# Patient Record
(demographics unavailable — no encounter records)

---

## 2024-10-15 NOTE — ASSESSMENT
[FreeTextEntry1] : The patient has thyroid cancer that was diagnosed in 2007 and she never had a recurrence - she did not have radioactive iodine therapy post-operatively.   At this time, the likelihood of recurrence for her thyroid cancer is low so her target TSH is the normal range for the testing lab.   Last TSH was at goal.     The patient had a parathyroid adenoma s/p parathyroidectomy - corrected calcium is normal, in October 2023, PTH was normal - I will monitor her corrected calcium level  Last TSH - August 2024 - N   Plan: 1. Continue levothyroxine (generic) 75 micrograms po daily 2. Labs to be done in 6 months - TSH, CMP, 25 OH vitamin D, PTH 3. Follow up in 6 months to review results.

## 2024-10-15 NOTE — HISTORY OF PRESENT ILLNESS
[FreeTextEntry1] : Problems: 1. Thyroid cancer 2. Parathyroid adenoma  Thyroid cancer/Parathyroid adenoma 1. Diagnosed in 2007 with thyroid cancer - papillary. Subtotal thyroidectomy was done in 2007. A parathyroid gland was removed also and the patient was found to have a parathyroid adenoma - she is unsure if she had hypercalcemia pre-operatively. Patient did not have radioactive iodine therapy post operatively. Pathology 2007 - subtotal thyroidectomy - Papillary thyroid cancer measuring 1.7 cm - no invasion through capsule or extrathyroidal tissues, no lymphovascular invasion, margins negative. A parathyroid adenoma was also present - weight of adenoma not stated 2. Labs: Corrected calcium normal in August 2022, thyroglobulin antibodies in 2021 were negative I did not see any thyroid US post operatively No thyroglobulin levels were seen between 2007 post op and April 2024.  October 2022 - PTH N, 25 OH vitamin D 33.6, Cr N, corrected calcium N August 2024 - Thyroglobulin 6.65 (1.6 to 59.9), thyroglobulin antibodies < 20 N, TSH N 3.  No family history of thyroid disorders, no family history of thyroid cancer, no personal history of radiation treatment 4. Meds: Levothyroxine (generic) 75 micrograms po daily - fully compliant and patient advised on the appropriate use of levothyroxine.

## 2024-10-15 NOTE — PHYSICAL EXAM
[de-identified] : General: No distress, well nourished Eyes: Normal Sclera, EOMI, PERRL ENT: Normal appearance of the nose, normal oropharynx Neck/Thyroid: No cervical lymphadenopathy, no neck masses, non-tender Respiratory: No use of accessory muscles of respiration, vesicular breath sounds heard bilaterally, no crepitations or ronchi Cardiovascular: S1 and S2 heard and normal, no S3 or S4, no murmurs, radial pulse normal bilaterally Abdomen: soft, non-tender, no masses, normal bowel sounds Musculoskeletal: No swelling or deformities of joints of hands, no pedal edema Neurological: Normal range of motion in the hands, Normal brachioradialis reflexes bilaterally Psychiatry: Patient converses normally, good judgement and insight Skin: No rashes in hands, no nodules palpated in hands

## 2024-11-19 NOTE — HISTORY OF PRESENT ILLNESS
[FreeTextEntry1] : new bumps increased size for 2-3 months. Painful bump right back. [de-identified] : No personal or family history of cutaneous malignancy. Recent cutaneous exam.

## 2024-11-19 NOTE — ASSESSMENT
[FreeTextEntry1] : Alert, oriented, well, pleasant.  Brown yellow papules face hands back. Seborrheic keratoses. No treatment.  White papulonodule right nose bridge. Cyst.   Dark papulonodule right cheek 0.9cm, tender. Abscess.  Informed consent given. Side effects reviewed. Xylo epi. Incision and drainage of abscess right cheek. Dark brown keratotic debris drained. Aluminum chloride application. Wound care. Incision and drainage white debris right nose cyst.  Right upper back 0.8cm keratotic crusted erythematous papule. Inflamed seborrheic keratosis. Picks.  Informed consent given. Side effects discussed. Cryotherapy. Tolerated well. Wound care instructed.

## 2024-11-27 NOTE — HISTORY OF PRESENT ILLNESS
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  The knee pain started 4 days ago -  can have 10/10 pain with extension/flexion, bending her knee.   Denies trauma, fevers, chills.  She reports 4 days ago in the AM she felt slight knee discomfort, later that day for 24 hours she was having an upset stomach but no diarrhea or vomiting. She also has some sinus congestion.

## 2024-11-27 NOTE — REVIEW OF SYSTEMS
[Fever] : no fever [Chills] : no chills [Earache] : no earache [Nasal Discharge] : nasal discharge [Abdominal Pain] : no abdominal pain [Nausea] : no nausea [Vomiting] : no vomiting

## 2024-11-27 NOTE — ASSESSMENT
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

## 2024-11-27 NOTE — PLAN
[FreeTextEntry1] : #Knee  -f/u X-ray today  -she takes ibuprofen for pain already  -can us supportive sleeve/ace wrap   #SIniusisit/URI  -declines COVID testing  -already using Flonase  -discussed if fevers, worsening symptoms lasting greater than 7-10 days, or symptoms that improve then worsen can consider start antibiotics - doxycycline 100 mg BID for 7 days with food

## 2024-11-27 NOTE — PHYSICAL EXAM
[Normal] : no acute distress, well nourished, well developed and well-appearing [de-identified] : no knee swelling, warmth, redness, pain with extension flexion and lateral movement of knee, can walk but has pain with bending knees and walking

## 2025-02-18 NOTE — PHYSICAL EXAM
[General Appearance - Alert] : alert [General Appearance - In No Acute Distress] : in no acute distress [General Appearance - Well Nourished] : well nourished [Sclera] : the sclera and conjunctiva were normal [PERRL With Normal Accommodation] : pupils were equal in size, round, and reactive to light [Extraocular Movements] : extraocular movements were intact [Outer Ear] : the ears and nose were normal in appearance [Oropharynx] : the oropharynx was normal [Neck Appearance] : the appearance of the neck was normal [Edema] : there was no peripheral edema [Abdomen Soft] : soft [Abdomen Tenderness] : non-tender [Cervical Lymph Nodes Enlarged Anterior Bilaterally] : anterior cervical [Supraclavicular Lymph Nodes Enlarged Bilaterally] : supraclavicular [No CVA Tenderness] : no ~M costovertebral angle tenderness [Abnormal Walk] : normal gait [Nail Clubbing] : no clubbing  or cyanosis of the fingernails [Musculoskeletal - Swelling] : no joint swelling seen [Motor Tone] : muscle strength and tone were normal [Skin Color & Pigmentation] : normal skin color and pigmentation [Skin Turgor] : normal skin turgor [] : no rash [No Focal Deficits] : no focal deficits [Oriented To Time, Place, And Person] : oriented to person, place, and time [Impaired Insight] : insight and judgment were intact [Affect] : the affect was normal [Respiration, Rhythm And Depth] : normal respiratory rhythm and effort [Cervical Lymph Nodes Enlarged Posterior Bilaterally] : posterior cervical [FreeTextEntry1] : Bony fullness and ?synovitis over b/l 2nd/3rd MCPs, R>L wrist, + subluxation/bony deformity prominently b/l thumb CMCs

## 2025-02-18 NOTE — ASSESSMENT
[FreeTextEntry1] : JOSÉ MIGUEL CHAUDHARY is a 63 year old woman with --  # Progressive bony/?synovitis changes in b/l 2nd/3rd MCPs, b/l wrists, subluxation and chronic deformity over thumbs. Rheumatologic w/u negative for SLE, RA, PsA/SpA, hemochromatosis, sarcoid prior and 2020 imaging negative but she has clinically progressed and exam does have features concerning for an inflammatory process despite Aug 2024 labs being normal.  - discussed with her ddx remains very broad at present - reviewed Aug labs and imaging with her  - repeat and round out serologies as below - repeat CXR 2/2 strong + FH of sarcoid  - MRI b/l hands to best eval for synovitis vs alternative etiology as superior test to XR/MSK US  - c/w NSAIDs until w/u complete as steroids can alter results - Motrin up to TID with food, stop promptly if GI upset - after w/u complete can trial prednisone 10mg/day as assess response - discussed long term meds will be chosen depending on which arthritis variant is ultimately diagnosed  # chronic, progressive arthritis in L spine since her 30s -DJD changes on recent imaging, no inflammatory features, positional neuropathic pain when sleeping only  # b/l knee OA, L>R - Ortho spine note appreciated - c/w HEP/conservative measures - c/w gel injections PRN with ortho   # prior osteopenia, potential steroid use  - repeat DEXA now - Reviewed current recommendations for dietary Ca 600mg BID with food, supplemental Vit D 1000 IU daily - Increase weight bearing exercise for goal of 30min 3-4x/week to maintain BMD - modalities reviewed with patient  - Will be having OP labs done with Endo in April  All questions and concerns addressed to my best possible ability, patient verbalizes understanding and is agreeable. RTC 6 weeks

## 2025-02-18 NOTE — REVIEW OF SYSTEMS
[As Noted in HPI] : as noted in HPI [Arthralgias] : arthralgias [Joint Pain] : joint pain [Negative] : Heme/Lymph [Joint Swelling] : joint swelling [Joint Stiffness] : joint stiffness

## 2025-02-18 NOTE — HISTORY OF PRESENT ILLNESS
[FreeTextEntry1] : JOSÉ MIGUEL CHAUDHARY is a 58 year old woman with progressive arthritis over the past 20 years. 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 Chronic low back and sacral pain with prolonged sitting, improved with standing, approximately 30 minutes of morning stiffness, resolves with stretching and heat. Acute exacerbation in November, requiring 2 courses of tapering prednisone. Prednisone did not help diffuse small joint pain. Motrin works better.   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 Imaging in 2016 was normal. No prolonged AM stiffness or juan synovitis in affected joints, no dactylitis or PIP/DIP involvement. + father's hands have a similar appearance, + sister with sarcoid and ankle arthritis. Chronic mild pain over bilateral wrists, bilateral elbows. Shoulders, knees, ankles, toes uninvolved. Presently taking Motrin 600mg daily with partial relief, no SE.  Today without significant complaints. Joint pain is at chronic baseline, back is improved from last visit. Inflammatory arthritis ROS remains negative for symmetrical peripheral joint synovitis, prolonged AM stiffness, rashes, eye inflammation, inflammatory low back pain, IBD.   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 No hx of hemochromatosis   DEXA 2016 -- osteopenia XR hands/wrists in 2016 without erosive dz  Labs - RF/CCP, [place] negative   My work-up -- Labs - Negative - SLE screen, ferritin/iron, ESR/CRP, HLA B27, RF/CCP, [facility] XR hands, wrists, SI joints - no inflammatory changes, + OA CXR without sarcoid changes  MSK U/S - chronic b/l 3rd PIP thickening but no active inflammation  ----------- 2/18/25 -- Last seen in 2020 as above. Returns acutely for significantly worsening hand pain with even mild use, + AM stiffness/pain, does swell even worse at times, wrists starting to bother her R>L. L >R knee pain, L local gel injection helped, can't wear heels without exacerbating knees. + b/l burning sciatica like pain when sleeps, chronic LBP, no pain with standing or sitting. Michelle machine is helping. Medrol pack from ortho helped with all pain, started being less effective <5mg/day. Inflammatory arthritis ROS negative for enthesitis, dactylitis, psoriasis/ rashes, eye inflammation, inflammatory low back pain, IBD. CTD ROS negative.  PMD labs from Aug 2024 negative from rheum standpoint. XR/MRI L knee with OA, CTL spine XR with DJD, hips normal.

## 2025-03-20 NOTE — ASSESSMENT
[FreeTextEntry1] : 3/20/25 In this particular case we note a 0-calcium score with a minor plaque. The arthritis is of greater concern at this point than cardiovascular risk given a calcium score of "0." I would also wonder about the role of rosuvastatin which may be contributory to myalgias and arthritic complaints and therefore we will decrease the rosuvastatin to 3 days/week and recheck fasting lipids on this lower dose.  the risks and benefits of Rinvoq were discussed and specifically I suspect the benefit outweighs the risk in this particular case and would like to treat this disabling progressive arthritis.  would continue to monitor especially as the rosuvastatin dose is decreased to 3 days/week she has upcoming blood work pending.  This is a high-risk encounter based upon medication review for side effects and benefits  shared decision making

## 2025-03-20 NOTE — PHYSICAL EXAM
[General Appearance - Well Developed] : well developed [Normal Appearance] : normal appearance [Well Groomed] : well groomed [General Appearance - Well Nourished] : well nourished [No Deformities] : no deformities [General Appearance - In No Acute Distress] : no acute distress [Normal Conjunctiva] : the conjunctiva exhibited no abnormalities [Eyelids - No Xanthelasma] : the eyelids demonstrated no xanthelasmas [Normal Oral Mucosa] : normal oral mucosa [No Oral Pallor] : no oral pallor [No Oral Cyanosis] : no oral cyanosis [Normal Jugular Venous A Waves Present] : normal jugular venous A waves present [Normal Jugular Venous V Waves Present] : normal jugular venous V waves present [No Jugular Venous Rice A Waves] : no jugular venous rice A waves [Respiration, Rhythm And Depth] : normal respiratory rhythm and effort [Exaggerated Use Of Accessory Muscles For Inspiration] : no accessory muscle use [Auscultation Breath Sounds / Voice Sounds] : lungs were clear to auscultation bilaterally [Heart Rate And Rhythm] : heart rate and rhythm were normal [Heart Sounds] : normal S1 and S2 [Murmurs] : no murmurs present [Abdomen Soft] : soft [Abdomen Tenderness] : non-tender [Abdomen Mass (___ Cm)] : no abdominal mass palpated [Abnormal Walk] : normal gait [Gait - Sufficient For Exercise Testing] : the gait was sufficient for exercise testing [Nail Clubbing] : no clubbing of the fingernails [Cyanosis, Localized] : no localized cyanosis [Petechial Hemorrhages (___cm)] : no petechial hemorrhages [Skin Color & Pigmentation] : normal skin color and pigmentation [] : no rash [No Venous Stasis] : no venous stasis [Skin Lesions] : no skin lesions [No Skin Ulcers] : no skin ulcer [No Xanthoma] : no  xanthoma was observed [Oriented To Time, Place, And Person] : oriented to person, place, and time [Affect] : the affect was normal [Mood] : the mood was normal [No Anxiety] : not feeling anxious

## 2025-03-20 NOTE — HISTORY OF PRESENT ILLNESS
[Preoperative Visit] : for a medical evaluation prior to surgery [Scheduled Procedure ___] : a [unfilled] [Date of Surgery ___] : on [unfilled] [Surgeon Name ___] : surgeon: [unfilled] [FreeTextEntry1] :  JOSÉ MIGUEL CHAUDHARY comes today for evaluation. she was advised to undergo a complete cardiac evaluation. She denies chest pains shortness of breath or loss of consciousness.  Recent blood pressure 125/74. Krystyna has known fibroids.

## 2025-03-20 NOTE — REASON FOR VISIT
[FreeTextEntry1] : 1/19/23 IVETTE feels an electrical feeling in her heart's been going on for" weeks."  She has a history of prolapse. Stress testing negative however she has a strong family history . tolerating rosuvastatin with improved lipid profile.   3/20/2025 Krystyna is being evaluated for severe disabling arthritis affecting her hand joints with large nodes in the interphalangeal joints, back pains which are disabling. she comes today really for evaluation for specific rheumatoloic therapy with respect to cardiovascular risk. Specifically, she is against the use of Humira since it had an ill effect on her sister. that not withstanding she is considering Rinvoq which has some warnings with respect to cardiovascular risk especially if there are underlying risk factors noted.

## 2025-04-03 NOTE — PHYSICAL EXAM
[General Appearance - Alert] : alert [General Appearance - In No Acute Distress] : in no acute distress [General Appearance - Well Nourished] : well nourished [Sclera] : the sclera and conjunctiva were normal [PERRL With Normal Accommodation] : pupils were equal in size, round, and reactive to light [Extraocular Movements] : extraocular movements were intact [Outer Ear] : the ears and nose were normal in appearance [Oropharynx] : the oropharynx was normal [Neck Appearance] : the appearance of the neck was normal [Respiration, Rhythm And Depth] : normal respiratory rhythm and effort [Edema] : there was no peripheral edema [Abdomen Soft] : soft [Abdomen Tenderness] : non-tender [Cervical Lymph Nodes Enlarged Posterior Bilaterally] : posterior cervical [Cervical Lymph Nodes Enlarged Anterior Bilaterally] : anterior cervical [Supraclavicular Lymph Nodes Enlarged Bilaterally] : supraclavicular [No CVA Tenderness] : no ~M costovertebral angle tenderness [Abnormal Walk] : normal gait [Nail Clubbing] : no clubbing  or cyanosis of the fingernails [Musculoskeletal - Swelling] : no joint swelling seen [Motor Tone] : muscle strength and tone were normal [Skin Color & Pigmentation] : normal skin color and pigmentation [Skin Turgor] : normal skin turgor [] : no rash [No Focal Deficits] : no focal deficits [Oriented To Time, Place, And Person] : oriented to person, place, and time [Impaired Insight] : insight and judgment were intact [Affect] : the affect was normal

## 2025-04-15 NOTE — HISTORY OF PRESENT ILLNESS
[FreeTextEntry1] : Problems: 1. Thyroid cancer 2. Parathyroid adenoma 3. Hypothyroidism  Thyroid cancer/Parathyroid adenoma 1. Diagnosed in 2007 with thyroid cancer - papillary. Subtotal thyroidectomy was done in 2007. A parathyroid gland was removed also and the patient was found to have a parathyroid adenoma - she is unsure if she had hypercalcemia pre-operatively. Patient did not have radioactive iodine therapy post operatively. Pathology 2007 - subtotal thyroidectomy - Papillary thyroid cancer measuring 1.7 cm - no invasion through capsule or extrathyroidal tissues, no lymphovascular invasion, margins negative. A parathyroid adenoma was also present - weight of adenoma not stated 2. Labs: Corrected calcium normal in August 2022, thyroglobulin antibodies in 2021 were negative I did not see any thyroid US post operatively No thyroglobulin levels were seen between 2007 post op and April 2024.  October 2022 - PTH N, 25 OH vitamin D 33.6, Cr N, corrected calcium N August 2024 - Thyroglobulin 6.65 (1.6 to 59.9), thyroglobulin antibodies < 20 N, TSH N April 2025 - PTH 23 (N), Cr N, corrected calcium N, 25 Vitamin D 32.7 3.  No family history of thyroid disorders, no family history of thyroid cancer, no personal history of radiation treatment 4. Meds: Levothyroxine (generic) 75 micrograms po daily - fully compliant and patient advised on the appropriate use of levothyroxine.

## 2025-04-15 NOTE — PHYSICAL EXAM
[de-identified] : General: No distress, well nourished Eyes: Normal Sclera, EOMI, PERRL ENT: Normal appearance of the nose, normal oropharynx Neck/Thyroid: No cervical lymphadenopathy, no neck masses, non-tender Respiratory: No use of accessory muscles of respiration, vesicular breath sounds heard bilaterally, no crepitations or ronchi Cardiovascular: S1 and S2 heard and normal, no S3 or S4, no murmurs, radial pulse normal bilaterally Abdomen: soft, non-tender, no masses, normal bowel sounds Musculoskeletal: No swelling or deformities of joints of hands, no pedal edema Neurological: Normal range of motion in the hands, Normal brachioradialis reflexes bilaterally Psychiatry: Patient converses normally, good judgement and insight Skin: No rashes in hands, no nodules palpated in hands

## 2025-04-15 NOTE — ASSESSMENT
[FreeTextEntry1] : The patient has thyroid cancer that was diagnosed in 2007 and she never had a recurrence - she did not have radioactive iodine therapy post-operatively.  At this time, the likelihood of recurrence for her thyroid cancer is low so her target TSH is the normal range for the testing lab.   Last TSH was at goal.    The patient had a parathyroid adenoma s/p parathyroidectomy - corrected calcium is normal, in April 2025, PTH and corrected calcium level were normal - I will monitor her corrected calcium level  Last TSH - April 2025 - N   Plan: 1. Continue levothyroxine (generic) 75 micrograms po daily 2. Labs to be done in 6 months - TSH, CMP, 25 OH vitamin D, PTH 3. Follow up in 6 months to review results.

## 2025-04-23 NOTE — ASSESSMENT
[FreeTextEntry1] : Alert, oriented, well, pleasant.  s/p i&d Nov. Firm. Growing. Painful. nodule with crust 4mm right cheek. Cyst. Informed consent given. Side effects reviewed. Incision and drainage, white a brown debris. Aluminum chloride application. wound care. f/u 1 month.

## 2025-04-23 NOTE — HISTORY OF PRESENT ILLNESS
[FreeTextEntry1] : Bump on right cheek is back. No treatment.  [de-identified] : No personal or family history of cutaneous malignancy.

## 2025-05-06 NOTE — DISCUSSION/SUMMARY
[FreeTextEntry1] : I spent the time noted on the day of this patient encounter preparing for, providing and documenting the above service. I have counseled and educated the patient on the differential, workup, disease course, and treatment/management plan. Education was provided to the patient during this encounter. All questions and concerns were answered and addressed in detail.  Rosa Elena Johnson NP, FNP-BC

## 2025-05-06 NOTE — PHYSICAL EXAM
[Chaperoned Physical Exam] : A chaperone was present in the examining room during all aspects of the physical examination. [MA] : MA [Appropriately responsive] : appropriately responsive [Alert] : alert [No Acute Distress] : no acute distress [Soft] : soft [Non-tender] : non-tender [Non-distended] : non-distended [No HSM] : No HSM [No Lesions] : no lesions [No Mass] : no mass [Oriented x3] : oriented x3 [Examination Of The Breasts] : a normal appearance [No Masses] : no breast masses were palpable [Labia Majora] : normal [Labia Minora] : normal [Atrophy] : atrophy [Normal] : normal [Absent] : absent [Uterine Adnexae] : non-palpable [FreeTextEntry2] : Camille EDMONDSON

## 2025-05-06 NOTE — HISTORY OF PRESENT ILLNESS
[FreeTextEntry1] : Pt is a 63-year-old who presents today for well woman exam. Pt's sister is in [facility] long term. LMP: RA BENJIE SILVESTRE RSO  POB:  PGYN: denies PMB, denies abl pap  PMH: thyroid cancer, Arthritis  PSH: HILLARY SILVESTRE RSO, thyroid, brain cancer?, torn hand tendon  Med: per MAR  All: Aspirin-chest pain, PCN-Hives, Latex- rash  SH: social ETOH  FH: Father with prostate cancer   Mammo: 2024 Colonoscopy: Cologuard last year  DEXA: 2025

## 2025-06-06 NOTE — ASSESSMENT
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 Rheumatologic w/u negative for SLE, RA, PsA/SpA, hemochromatosis, sarcoid prior and 2020 imaging negative but MRI with localized effusions and osteophytosis w/o erosions  = repeat serologies remain neg  = CXR neg = hx of hyperPTH but s/p parathyroidectomy and normal Ca now, avoiding all Ca supplementation  - given excellent steroid response will trial steroid sparing DMARD - pt elects for Arava as + ETOH socially - start 10mg/day. Risks and benefits of medication use were d/w patient including but not limited to GI toxicity, diarrhea, hair loss, and hepatotoxicity.  - repeat labs in 1 month  - c/w PRN daily prednisone 5mg/day - can use up to 10mg/day PRN worsening pain but keep log of use and response to lower dose steroid. Aware to avoid Motrin while on steroids  # chronic, progressive arthritis in L spine since her 30s -DJD changes on recent imaging, no inflammatory features, positional neuropathic pain when sleeping only  # b/l knee OA, L>R - Ortho spine note appreciated - c/w HEP/conservative measures - c/w gel injections PRN with ortho   # remains in osteopenia range on DEXA 2/2025 potential steroid use. Endo labs reviewed and stable  - repeat DEXA 2/2027 - c/w dietary Ca 600mg BID with food, supplemental Vit D 1000 IU daily - c/w weight bearing exercise for goal of 30min 3-4x/week to maintain BMD - modalities reviewed with patient   All questions and concerns addressed to my best possible ability, patient verbalizes understanding and is agreeable. RTC 2 months

## 2025-06-06 NOTE — REASON FOR VISIT
[Follow-Up: _____] : a [unfilled] follow-up visit [FreeTextEntry1] : 2nd/3rd MCP progressive deformity + diffuse spine DJD

## 2025-06-06 NOTE — PHYSICAL EXAM
[General Appearance - Alert] : alert [General Appearance - In No Acute Distress] : in no acute distress [General Appearance - Well Nourished] : well nourished [Sclera] : the sclera and conjunctiva were normal [PERRL With Normal Accommodation] : pupils were equal in size, round, and reactive to light [Extraocular Movements] : extraocular movements were intact [Outer Ear] : the ears and nose were normal in appearance [Oropharynx] : the oropharynx was normal [Neck Appearance] : the appearance of the neck was normal [Respiration, Rhythm And Depth] : normal respiratory rhythm and effort [Edema] : there was no peripheral edema [No CVA Tenderness] : no ~M costovertebral angle tenderness [Abnormal Walk] : normal gait [Nail Clubbing] : no clubbing  or cyanosis of the fingernails [Musculoskeletal - Swelling] : no joint swelling seen [Motor Tone] : muscle strength and tone were normal [Skin Color & Pigmentation] : normal skin color and pigmentation [Skin Turgor] : normal skin turgor [] : no rash [No Focal Deficits] : no focal deficits [Oriented To Time, Place, And Person] : oriented to person, place, and time [Impaired Insight] : insight and judgment were intact [Affect] : the affect was normal [FreeTextEntry1] : Bony fullness but resolution of prior synovitis b/l 2nd/3rd MCPs, R>L wrist, + subluxation/bony deformity prominently b/l thumb CMCs

## 2025-06-06 NOTE — REVIEW OF SYSTEMS
[As Noted in HPI] : as noted in HPI [Arthralgias] : arthralgias [Joint Pain] : joint pain [Joint Stiffness] : joint stiffness [Negative] : Heme/Lymph [Joint Swelling] : no joint swelling [de-identified] : Hx of thyroid ca/ hyperPTH s/p partial parathyroidectomy/thyroidectomy

## 2025-06-06 NOTE — HISTORY OF PRESENT ILLNESS
[FreeTextEntry1] : JOSÉ MIGUEL CHAUDHARY is a 58 year old woman with progressive arthritis over the past 20 years. 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 Chronic low back and sacral pain with prolonged sitting, improved with standing, approximately 30 minutes of morning stiffness, resolves with stretching and heat. Acute exacerbation in November, requiring 2 courses of tapering prednisone. Prednisone did not help diffuse small joint pain. Motrin works better.   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 Imaging in 2016 was normal. No prolonged AM stiffness or juan synovitis in affected joints, no dactylitis or PIP/DIP involvement. + father's hands have a similar appearance, + sister with sarcoid and ankle arthritis. Chronic mild pain over bilateral wrists, bilateral elbows. Shoulders, knees, ankles, toes uninvolved. Presently taking Motrin 600mg daily with partial relief, no SE.  Today without significant complaints. Joint pain is at chronic baseline, back is improved from last visit. Inflammatory arthritis ROS remains negative for symmetrical peripheral joint synovitis, prolonged AM stiffness, rashes, eye inflammation, inflammatory low back pain, IBD.   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 No hx of hemochromatosis   DEXA 2016 -- osteopenia XR hands/wrists in 2016 without erosive dz  Labs - RF/CCP, [facility] negative   My work-up -- Labs - Negative - SLE screen, ferritin/iron, ESR/CRP, HLA B27, RF/CCP, [facility] XR hands, wrists, SI joints - no inflammatory changes, + OA CXR without sarcoid changes  MSK U/S - chronic b/l 3rd PIP thickening but no active inflammation  ----------- 2/18/25 -- Last seen in 2020 as above. Returns acutely for significantly worsening hand pain with even mild use, + AM stiffness/pain, does swell even worse at times, wrists starting to bother her R>L. L >R knee pain, L local gel injection helped, can't wear heels without exacerbating knees. + b/l burning sciatica like pain when sleeps, chronic LBP, no pain with standing or sitting. Michelle machine is helping. Medrol pack from ortho helped with all pain, started being less effective <5mg/day. Inflammatory arthritis ROS negative for enthesitis, dactylitis, psoriasis/ rashes, eye inflammation, inflammatory low back pain, IBD. CTD ROS negative.  PMD labs from Aug 2024 negative from rheum standpoint. XR/MRI L knee with OA, CTL spine XR with DJD, hips normal.   4/3/25 -- Prednisone 10mg helped, only used 1 trial, Motrin helping as well. Damp/rainy days are worse, no synovitis in any other areas, no extra-articular inflammatory sx.   6/6/25 -- Prednisone 5mg/day helping with swelling, Am stiffness. Still needing Motrin TID for her chronic spine pain. No new joints, no extra-articular inflammatory sx, no infectious sx or med SE, also feeling generally better since warmer weather.